# Patient Record
Sex: MALE | Race: BLACK OR AFRICAN AMERICAN | NOT HISPANIC OR LATINO | Employment: UNEMPLOYED | URBAN - METROPOLITAN AREA
[De-identification: names, ages, dates, MRNs, and addresses within clinical notes are randomized per-mention and may not be internally consistent; named-entity substitution may affect disease eponyms.]

---

## 2021-03-26 ENCOUNTER — OFFICE VISIT (OUTPATIENT)
Dept: FAMILY MEDICINE CLINIC | Facility: CLINIC | Age: 71
End: 2021-03-26

## 2021-03-26 VITALS
BODY MASS INDEX: 19.48 KG/M2 | OXYGEN SATURATION: 97 % | HEIGHT: 66 IN | TEMPERATURE: 97.3 F | SYSTOLIC BLOOD PRESSURE: 114 MMHG | WEIGHT: 121.2 LBS | DIASTOLIC BLOOD PRESSURE: 68 MMHG | HEART RATE: 70 BPM | RESPIRATION RATE: 14 BRPM

## 2021-03-26 DIAGNOSIS — K40.90 UNILATERAL INGUINAL HERNIA WITHOUT OBSTRUCTION OR GANGRENE, RECURRENCE NOT SPECIFIED: Primary | ICD-10-CM

## 2021-03-26 PROCEDURE — 99202 OFFICE O/P NEW SF 15 MIN: CPT | Performed by: PHYSICIAN ASSISTANT

## 2021-03-26 NOTE — PROGRESS NOTES
Subjective   Todd Mancilla is a 70 y.o. male  Establish Care and Hernia (Inguinal)      History of Present Illness  Patient is a pleasant 70-year-old black male who is a new patient to our practice he comes in complaint of left lower quadrant pain possible inguinal hernia pain states been going over 6 months occasionally cause pain seems to be getting bigger  The following portions of the patient's history were reviewed and updated as appropriate: allergies, current medications, past social history and problem list    Review of Systems   Constitutional: Negative for fatigue and unexpected weight change.   Respiratory: Negative for cough, chest tightness and shortness of breath.    Cardiovascular: Negative for chest pain, palpitations and leg swelling.   Gastrointestinal: Negative for nausea.        Left inguinal hernia   Skin: Negative for color change and rash.   Neurological: Negative for dizziness, syncope, weakness and headaches.       Objective     Vitals:    03/26/21 1531   BP: 114/68   Pulse: 70   Resp: 14   Temp: 97.3 °F (36.3 °C)   SpO2: 97%       Physical Exam  Vitals and nursing note reviewed.   Constitutional:       Appearance: He is well-developed.   Neck:      Vascular: No JVD.   Cardiovascular:      Rate and Rhythm: Normal rate and regular rhythm.      Pulses: Normal pulses.      Heart sounds: Normal heart sounds. No murmur heard.     Pulmonary:      Effort: Pulmonary effort is normal. No respiratory distress.      Breath sounds: Normal breath sounds.   Abdominal:      General: Bowel sounds are normal.      Palpations: Abdomen is soft.      Tenderness: There is no abdominal tenderness.       Skin:     General: Skin is warm and dry.         Assessment/Plan     Diagnoses and all orders for this visit:    1. Unilateral inguinal hernia without obstruction or gangrene, recurrence not specified (Primary)  -     Ambulatory Referral to General Surgery    Refer to surgery    Follow-up for full physical  examination

## 2021-04-25 ENCOUNTER — APPOINTMENT (OUTPATIENT)
Dept: PREADMISSION TESTING | Facility: HOSPITAL | Age: 71
End: 2021-04-25

## 2021-04-25 PROCEDURE — C9803 HOPD COVID-19 SPEC COLLECT: HCPCS

## 2021-04-25 PROCEDURE — U0004 COV-19 TEST NON-CDC HGH THRU: HCPCS

## 2021-04-26 LAB — SARS-COV-2 RNA NOSE QL NAA+PROBE: NOT DETECTED

## 2021-04-27 ENCOUNTER — LAB REQUISITION (OUTPATIENT)
Dept: LAB | Facility: HOSPITAL | Age: 71
End: 2021-04-27

## 2021-04-27 DIAGNOSIS — K40.90 UNILATERAL INGUINAL HERNIA, WITHOUT OBSTRUCTION OR GANGRENE, NOT SPECIFIED AS RECURRENT: ICD-10-CM

## 2021-04-27 PROCEDURE — 88302 TISSUE EXAM BY PATHOLOGIST: CPT | Performed by: SURGERY

## 2021-04-28 LAB
CYTO UR: NORMAL
LAB AP CASE REPORT: NORMAL
LAB AP CLINICAL INFORMATION: NORMAL
PATH REPORT.FINAL DX SPEC: NORMAL
PATH REPORT.GROSS SPEC: NORMAL

## 2021-12-08 ENCOUNTER — NURSE TRIAGE (OUTPATIENT)
Dept: CALL CENTER | Facility: HOSPITAL | Age: 71
End: 2021-12-08

## 2021-12-08 NOTE — TELEPHONE ENCOUNTER
Numbness in right hand for one month with tingling, symptoms are becoming worse. Will be going to the ED  Reason for Disposition  • [1] Tingling (e.g., pins and needles) of the face, arm / hand, or leg / foot on one side of the body AND [2] present now (Exceptions: chronic/recurrent symptom lasting > 4 weeks or tingling from known cause, such as: bumped elbow, carpal tunnel syndrome, pinched nerve, frostbite)    Additional Information  • Negative: [1] SEVERE weakness (i.e., unable to walk or barely able to walk, requires support) AND [2] new-onset or worsening  • Negative: [1] Weakness (i.e., paralysis, loss of muscle strength) of the face, arm / hand, or leg / foot on one side of the body AND [2] sudden onset AND [3] present now (Exception: Bell's palsy suspected [i.e., weakness only one side of the face, developing over hours to days, no other symptoms])  • Negative: [1] Numbness (i.e., loss of sensation) of the face, arm / hand, or leg / foot on one side of the body AND [2] sudden onset AND [3] present now  • Negative: [1] Loss of speech or garbled speech AND [2] sudden onset AND [3] present now  • Negative: Difficult to awaken or acting confused (e.g., disoriented, slurred speech)  • Negative: Sounds like a life-threatening emergency to the triager  • Negative: Confusion, disorientation, or hallucinations is main symptom  • Negative: Neck pain is main symptom (and having weakness, numbness, or tingling in arm / hand because of neck pain)  • Negative: Back pain is main symptom (and having weakness, numbness, or tingling in leg because of back pain)  • Negative: Hand pain is main symptom (and having mild weakness, numbness, or tingling in hand related to hand pain)  • Negative: Dizziness is main symptom  • Negative: Vision loss or change is main symptom  • Negative: Followed a head injury within last 3 days  • Negative: Followed a neck injury within last 3 days  • Negative: [1] Tingling in both hands and/or feet  "AND [2] breathing faster than normal AND [3] feels similar to prior panic attack or hyperventilation episode  • Negative: Weakness in both sides of the body or weakness all over  • Negative: Headache  (and neurologic deficit)  • Negative: [1] Back pain AND [2] numbness (loss of sensation) in groin or rectal area  • Negative: [1] Unable to urinate (or only a few drops) > 4 hours AND [2] bladder feels very full (e.g., palpable bladder or strong urge to urinate)  • Negative: [1] Loss of bladder or bowel control (urine or bowel incontinence; wetting self, leaking stool) AND [2] new-onset  • Negative: [1] Weakness (i.e., paralysis, loss of muscle strength) of the face, arm / hand, or leg / foot on one side of the body AND [2] sudden onset AND [3] brief (now gone)  • Negative: [1] Numbness (i.e., loss of sensation) of the face, arm / hand, or leg / foot on one side of the body AND [2] sudden onset AND [3] brief (now gone)  • Negative: [1] Loss of speech or garbled speech AND [2] sudden onset AND [3] brief (now gone)  • Negative: Bell's palsy suspected (i.e., weakness on only one side of the face, developing over hours to days, no other symptoms)  • Negative: Patient sounds very sick or weak to the triager  • Negative: Neck pain (and neurologic deficit)  • Negative: Back pain (and neurologic deficit)  • Negative: [1] Weakness of the face, arm / hand, or leg / foot on one side of the body AND [2] gradual onset (e.g., days to weeks) AND [3] present now  • Negative: [1] Numbness (i.e., loss of sensation) of the face, arm / hand, or leg / foot on one side of the body AND [2] gradual onset (e.g., days to weeks) AND [3] present now  • Negative: [1] Loss of speech or garbled speech AND [2] gradual onset (e.g., days to weeks) AND [3] present now    Answer Assessment - Initial Assessment Questions  1. SYMPTOM: \"What is the main symptom you are concerned about?\" (e.g., weakness, numbness)      Numbness and tingling  2. ONSET: \"When " "did this start?\" (minutes, hours, days; while sleeping)      One month ago  3. LAST NORMAL: \"When was the last time you were normal (no symptoms)?\"      One month  4. PATTERN \"Does this come and go, or has it been constant since it started?\"  \"Is it present now?\"      constang  5. CARDIAC SYMPTOMS: \"Have you had any of the following symptoms: chest pain, difficulty breathing, palpitations?\"      na  6. NEUROLOGIC SYMPTOMS: \"Have you had any of the following symptoms: headache, dizziness, vision loss, double vision, changes in speech, unsteady on your feet?\"      no  7. OTHER SYMPTOMS: \"Do you have any other symptoms?\"      Fingers are tingling  8. PREGNANCY: \"Is there any chance you are pregnant?\" \"When was your last menstrual period?\"      na    Protocols used: NEUROLOGIC DEFICIT-ADULT-AH      "